# Patient Record
Sex: FEMALE | Race: WHITE
[De-identification: names, ages, dates, MRNs, and addresses within clinical notes are randomized per-mention and may not be internally consistent; named-entity substitution may affect disease eponyms.]

---

## 2017-04-29 ENCOUNTER — HOSPITAL ENCOUNTER (EMERGENCY)
Dept: HOSPITAL 41 - JD.ED | Age: 22
Discharge: HOME | End: 2017-04-29
Payer: COMMERCIAL

## 2017-04-29 VITALS — SYSTOLIC BLOOD PRESSURE: 100 MMHG | DIASTOLIC BLOOD PRESSURE: 65 MMHG

## 2017-04-29 DIAGNOSIS — F41.8: Primary | ICD-10-CM

## 2017-04-29 DIAGNOSIS — Z79.899: ICD-10-CM

## 2017-04-29 DIAGNOSIS — K21.9: ICD-10-CM

## 2017-04-29 PROCEDURE — 36415 COLL VENOUS BLD VENIPUNCTURE: CPT

## 2017-04-29 PROCEDURE — 80306 DRUG TEST PRSMV INSTRMNT: CPT

## 2017-04-29 PROCEDURE — 99285 EMERGENCY DEPT VISIT HI MDM: CPT

## 2017-04-29 PROCEDURE — 81001 URINALYSIS AUTO W/SCOPE: CPT

## 2017-04-29 PROCEDURE — 85025 COMPLETE CBC W/AUTO DIFF WBC: CPT

## 2017-04-29 PROCEDURE — 84443 ASSAY THYROID STIM HORMONE: CPT

## 2017-04-29 PROCEDURE — 80053 COMPREHEN METABOLIC PANEL: CPT

## 2017-04-29 PROCEDURE — 84702 CHORIONIC GONADOTROPIN TEST: CPT
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
## 2017-07-28 ENCOUNTER — HOSPITAL ENCOUNTER (EMERGENCY)
Dept: HOSPITAL 41 - JD.ED | Age: 22
Discharge: HOME | End: 2017-07-28
Payer: COMMERCIAL

## 2017-07-28 VITALS — SYSTOLIC BLOOD PRESSURE: 115 MMHG | DIASTOLIC BLOOD PRESSURE: 69 MMHG
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
## 2017-07-28 DIAGNOSIS — F31.9: ICD-10-CM

## 2017-07-28 DIAGNOSIS — S66.912A: Primary | ICD-10-CM

## 2017-07-28 DIAGNOSIS — Z79.899: ICD-10-CM

## 2017-07-28 DIAGNOSIS — K21.9: ICD-10-CM
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
## 2019-02-14 ENCOUNTER — HOSPITAL ENCOUNTER (INPATIENT)
Dept: HOSPITAL 41 - JD.OB | Age: 24
LOS: 2 days | Discharge: HOME | End: 2019-02-16
Attending: OBSTETRICS & GYNECOLOGY | Admitting: OBSTETRICS & GYNECOLOGY
Payer: COMMERCIAL

## 2019-02-14 DIAGNOSIS — K21.9: ICD-10-CM

## 2019-02-14 DIAGNOSIS — Z3A.39: ICD-10-CM

## 2019-02-14 DIAGNOSIS — F31.9: ICD-10-CM

## 2019-02-14 DIAGNOSIS — Z79.899: ICD-10-CM

## 2019-02-14 DIAGNOSIS — M79.7: ICD-10-CM

## 2019-02-14 DIAGNOSIS — F41.9: ICD-10-CM

## 2019-02-14 PROCEDURE — 10907ZC DRAINAGE OF AMNIOTIC FLUID, THERAPEUTIC FROM PRODUCTS OF CONCEPTION, VIA NATURAL OR ARTIFICIAL OPENING: ICD-10-PCS | Performed by: OBSTETRICS & GYNECOLOGY

## 2019-02-14 PROCEDURE — 3E0R3BZ INTRODUCTION OF ANESTHETIC AGENT INTO SPINAL CANAL, PERCUTANEOUS APPROACH: ICD-10-PCS

## 2019-02-14 PROCEDURE — 3E0P7VZ INTRODUCTION OF HORMONE INTO FEMALE REPRODUCTIVE, VIA NATURAL OR ARTIFICIAL OPENING: ICD-10-PCS | Performed by: OBSTETRICS & GYNECOLOGY

## 2019-02-14 PROCEDURE — 0HQ9XZZ REPAIR PERINEUM SKIN, EXTERNAL APPROACH: ICD-10-PCS | Performed by: OBSTETRICS & GYNECOLOGY

## 2019-02-14 PROCEDURE — 00HU33Z INSERTION OF INFUSION DEVICE INTO SPINAL CANAL, PERCUTANEOUS APPROACH: ICD-10-PCS

## 2019-02-14 NOTE — PCM.PREANE
Preanesthetic Assessment





- Anesthesia/Transfusion/Family Hx


Anesthesia History: Prior Anesthesia Without Reaction


Family History of Anesthesia Reaction: No


Transfusion History: Prior Transfusion Without Reaction





- Review of Systems


General: No Symptoms


Pulmonary: No Symptoms


Cardiovascular: No Symptoms


Gastrointestinal: Abdominal Pain


Neurological: No Symptoms


Other: Reports: None





- Physical Assessment


O2 Sat by Pulse Oximetry: 97


Respiratory Rate: 18


Vital Signs: 





 Last Vital Signs











Temp  37.1 C   02/14/19 07:23


 


Pulse  86   02/14/19 07:23


 


Resp  18   02/14/19 07:23


 


BP  107/77   02/14/19 07:23


 


Pulse Ox  97   02/14/19 07:23











Height: 1.65 m


Weight: 89.675 kg


ASA Class: 2


Mental Status: Alert & Oriented x3


Airway Class: Mallampati = 1


Dentition: Reports: Normal Dentition


Thyro-Mental Finger Breadths: 3


Mouth Opening Finger Breadths: 3


ROM/Head Extension: Full


Lungs: Clear to Auscultation, Normal Respiratory Effort


Cardiovascular: Regular Rate, Regular Rhythm





- Lab


Values: 





 Laboratory Last Values











WBC  10.29 K/mm3 (3.98-10.04)  H  02/14/19  08:30    


 


RBC  4.08 M/mm3 (3.98-5.22)   02/14/19  08:30    


 


Hgb  10.8 gm/L (11.2-15.7)  L  02/14/19  08:30    


 


Hct  34.6 % (34.1-44.9)   02/14/19  08:30    


 


MCV  84.8 fl (79.4-94.8)   02/14/19  08:30    


 


MCH  26.5 pg (25.6-32.2)   02/14/19  08:30    


 


MCHC  31.2 g/dl (32.2-35.5)  L  02/14/19  08:30    


 


RDW Std Deviation  60.3 fL (36.4-46.3)  H  02/14/19  08:30    


 


Plt Count  122 K/mm3 (182-369)  L  02/14/19  08:30    


 


MPV  11.5 fl (9.4-12.3)   02/14/19  08:30    


 


Neut % (Auto)  79.6 % (34.0-71.1)  H  02/14/19  08:30    


 


Lymph % (Auto)  14.1 % (19.3-51.7)  L  02/14/19  08:30    


 


Mono % (Auto)  4.7 % (4.7-12.5)   02/14/19  08:30    


 


Eos % (Auto)  1.0  (0.7-5.8)   02/14/19  08:30    


 


Baso % (Auto)  0.1 % (0.1-1.2)   02/14/19  08:30    


 


Neut # (Auto)  8.20 K/mm3 (1.56-6.13)  H  02/14/19  08:30    


 


Lymph # (Auto)  1.45 K/mm3 (1.18-3.74)   02/14/19  08:30    


 


Mono # (Auto)  0.48 K/mm3 (0.24-0.36)  H  02/14/19  08:30    


 


Eos # (Auto)  0.10 K/mm3 (0.04-0.36)   02/14/19  08:30    


 


Baso # (Auto)  0.01 K/mm3 (0.01-0.08)   02/14/19  08:30    


 


Blood Type  A NEGATIVE   02/14/19  08:30    


 


Gel Antibody Screen  Positive   02/14/19  08:30    


 


Crossmatch  See Detail   02/14/19  08:30    














- Allergies


Allergies/Adverse Reactions: 


 Allergies











Allergy/AdvReac Type Severity Reaction Status Date / Time


 


No Known Allergies Allergy   Verified 07/28/17 12:53














- Acknowledgements


Anesthesia Type Planned: Epidural


Pt an Appropriate Candidate for the Planned Anesthesia: Yes


Alternatives and Risks of Anesthesia Discussed w Pt/Guardian: Yes


Pt/Guardian Understands and Agrees with Anesthesia Plan: Yes





PreAnesthesia Questionnaire





- Past Health History


Medical/Surgical History: Denies Medical/Surgical History


Cardiovascular History: Reports: None


Respiratory History: Reports: None


Gastrointestinal History: Reports: GERD


Genitourinary History: Reports: None


OB/GYN History: Reports: Pregnancy


Musculoskeletal History: Reports: Fibromyalgia


Neurological History: Reports: None


Psychiatric History: Reports: Anxiety, Bipolar, Depression, PTSD


Other Psychiatric History: postpartum;hypermania


Endocrine/Metabolic History: Reports: None


Hematologic History: Reports: Anemia


Other Hematologic History: pregnancy induced


Immunologic History: Reports: None


Oncologic (Cancer) History: Reports: None


Dermatologic History: Reports: None





- Past Surgical History


Head Surgeries/Procedures: Reports: None





- SUBSTANCE USE


Smoking Status *Q: Never Smoker


Second Hand Smoke Exposure: No


Recreational Drug Use History: No





- HOME MEDS


Home Medications: 


 Home Meds





Acetaminophen [Tylenol] 650 mg PO Q4H PRN #0 tablet 10/12/16 [Rx]


Ibuprofen [IJD: Ibuprofen] 600 mg PO Q6H PRN #0 tablet 10/12/16 [Rx]


Birth Control 1 tab PO DAILY 04/29/17 [History]


lamoTRIgine [Lamotrigine] 200 mg PO DAILY 04/29/17 [History]


ClonazePAM [KlonoPIN] 0.25 mg PO BID PRN 07/28/17 [History]


QUEtiapine [SEROquel] 25 mg PO BEDTIME 07/28/17 [History]











- CURRENT (IN HOUSE) MEDS


Current Meds: 





 Current Medications





Diphenhydramine HCl (Benadryl)  25 mg IVPUSH Q6H PRN


   PRN Reason: Pruritis


Ephedrine Sulfate (Ephedrine Sulfate)  5 mg IVPUSH ASDIRECTED PRN


   PRN Reason: Hypotension


Fentanyl (Sublimaze)  100 mcg EPIDUR ONETIME PRN


   PRN Reason: Pain


   Last Admin: 02/14/19 12:34 Dose:  100 mcg


Fentanyl/Bupivacaine HCl (Fentanyl-Bupiv-Ns 2 Mcg/Ml-0.125%)  100 ml EP 

ASDIRECTED JOAN


   Last Admin: 02/14/19 12:34 Dose:  100 ml


Lactated Ringer's (Ringers, Lactated)  1,000 mls @ 100 mls/hr IV ASDIRECTED JOAN


   Last Admin: 02/14/19 12:15 Dose:  100 mls/hr


Oxytocin/Lactated Ringer's (Pitocin In Lr 10 Units/1,000 Ml)  10 unit in 1,000 

mls @ 12 mls/hr IV TITRATE JOAN; Protocol


Oxytocin/Lactated Ringer's (Pitocin In Lr 10 Units/1,000 Ml)  10 unit in 1,000 

mls @ 500 mls/hr IV .CONTINUOUS JOAN


Misoprostol (Cytotec)  50 mcg VAG Q3HR JOAN


Nalbuphine HCl (Nubain)  10 mg IVPUSH Q2H PRN


   PRN Reason: pain


Ondansetron HCl (Zofran)  4 mg IVPUSH ONETIME PRN


   PRN Reason: Nausea/Vomiting


Sodium Chloride (Saline Flush)  10 ml FLUSH ASDIRECTED PRN


   PRN Reason: Keep Vein Open





Discontinued Medications





Bupivacaine HCl (Marcaine 0.5%) Confirm Administered Dose 30 ml .ROUTE .STK-MED 

ONE


   Stop: 02/14/19 08:02


Misoprostol (Cytotec) Confirm Administered Dose 25 mcg .ROUTE .STK-MED ONE


   Stop: 02/14/19 07:54


Misoprostol (Cytotec)  25 mcg VAG ONETIME ONE


   Stop: 02/14/19 08:20


   Last Admin: 02/14/19 07:55 Dose:  25 mcg

## 2019-02-14 NOTE — PCM.LDHP
L&D History of Present Illness





- General


Date of Service: 19


Admit Problem/Dx: 


 Admission Diagnosis/Problem











Admission Diagnosis/Problem    














Source of Information: Patient


History Limitations: Reports: No Limitations





- History of Present Illness


Introduction:: 





24-year-old  002 ROSA 2/15/19. Estimated gestational age 39 weeks and 6 

days. Presented to labor and delivery for induction of labor GBS negative. 

Patient has gestational diabetes taking glyburide 2.5 mg at breakfast and lunch 

and supper. Blood type A- antibody screen negative hemoglobin hematocrit 13.1 

39.3 on 18. Platelets 209,000. Rubella immune. Serology nonreactive. Urine 

culture mixed effie. Hepatitis B surface antigen negative. HIV negative. GC and 

chlamydia probe negative TSH within normal limits.





18 hemoglobin/hematocrit 10.3/32.6 platelets 178,000, 1 hour OB glucose 

screen 141. Three-hour glucose tolerance test fasting 73, 1 hour 143, two-hour 

166, 3 hour 157. Antibody screen negative RhoGam given on 18. RPR 

nonreactive.





1/3/19 hemoglobin/hematocrit 10.5/35.0 platelets 154,000.





19 group B strep negative.





Plan induction and delivery.





This note was created, at least in part, by the use of Dragon voice dictation 

system. Inadvertent typographical errors, due to software recognition problems, 

may exist.


Improves with: Reports: None


Worsens with: Reports: None


Associated Symptoms: Reports: N





- Related Data


Allergies/Adverse Reactions: 


 Allergies











Allergy/AdvReac Type Severity Reaction Status Date / Time


 


No Known Allergies Allergy   Verified 17 12:53











Home Medications: 


 Home Meds





Acetaminophen [Tylenol] 650 mg PO Q4H PRN #0 tablet 10/12/16 [Rx]


Ibuprofen [IJD: Ibuprofen] 600 mg PO Q6H PRN #0 tablet 10/12/16 [Rx]


Birth Control 1 tab PO DAILY 17 [History]


lamoTRIgine [Lamotrigine] 200 mg PO DAILY 17 [History]


ClonazePAM [KlonoPIN] 0.25 mg PO BID PRN 17 [History]


QUEtiapine [SEROquel] 25 mg PO BEDTIME 17 [History]











Past Medical History





- Past Health History


Medical/Surgical History: Denies Medical/Surgical History


Cardiovascular History: Reports: None


Respiratory History: Reports: None


Gastrointestinal History: Reports: GERD


Genitourinary History: Reports: None


OB/GYN History: Reports: Pregnancy


Musculoskeletal History: Reports: Fibromyalgia


Neurological History: Reports: None


Psychiatric History: Reports: Anxiety, Bipolar, Depression


Other Psychiatric History: postpartum;hypermania


Endocrine/Metabolic History: Reports: None


Hematologic History: Reports: Anemia


Other Hematologic History: pregnancy induced


Immunologic History: Reports: None


Oncologic (Cancer) History: Reports: None


Dermatologic History: Reports: None





- Past Surgical History


Head Surgeries/Procedures: Reports: None





Social & Family History





- Family History


Family Medical History: Noncontributory





- Caffeine Use


Caffeine Use: Reports: Coffee





H&P Review of Systems





- Review of Systems:


Review Of Systems: See Below


General: Reports: No Symptoms


HEENT: Reports: No Symptoms


Pulmonary: Reports: No Symptoms


Cardiovascular: Reports: No Symptoms


Gastrointestinal: Reports: No Symptoms


Genitourinary: Reports: No Symptoms


Musculoskeletal: Reports: No Symptoms


Skin: Reports: No Symptoms


Psychiatric: Reports: No Symptoms


Neurological: Reports: No Symptoms


Hematologic/Lymphatic: Reports: No Symptoms


Immunologic: Reports: No Symptoms





L&D Exam





- Exam


Exam: See Below





- OB Specific


Fundal Height In cm: 39


Fetal Movement: Active


Fetal Heart Tones: Present


Fetal Heart Tones per Min: 135


Fetal Heart Rate (FHR) Variability: Moderate (6-25 bmp)


Birth Presentation: Vertex





- Fontana Score


Fontana Score Cervix Position: Posterior


Fontana Score Consistency: Soft


Fontana Score Effacement: 0-30%


Fontana Score Dilation: 1-2 cm


Fontana Score Infant's Station: -1 ,0


Fontana Score Total: 5





- Exam


General: Alert, Oriented


HEENT: Conjunctiva Clear, Mucosa Moist & Pink, Pupils Equal, PERRLA


Neck: Supple, Trachea Midline


Lungs: Clear to Auscultation, Normal Respiratory Effort


Cardiovascular: Regular Rate, Regular Rhythm


GI/Abdominal Exam: Normal Bowel Sounds, Soft


Genitourinary: Normal external exam


Extremities: Normal Inspection, Normal Range of Motion, Non-Tender, No Pedal 

Edema, Normal Capillary Refill


Skin: Warm, Dry, Intact


Neurological: Reflexes Equal Bilateral


Psychiatric: Alert, Normal Affect, Normal Mood





- Problem List


(1) 39 weeks gestation of pregnancy


SNOMED Code(s): 33635005


   ICD Code: Z3A.39 - 39 WEEKS GESTATION OF PREGNANCY   Status: Acute   Current 

Visit: Yes   





(2) Gestational diabetes mellitus (GDM) affecting third pregnancy


SNOMED Code(s): 68424714360173


   ICD Code: O24.419 - GESTATIONAL DIABETES MELLITUS IN PREGNANCY, UNSP CONTROL

   Status: Acute   Current Visit: Yes   


Problem List Initiated/Reviewed/Updated: No


Assessment/Plan Comment:: 





Plan induction and delivery.





For Cytotec 25 g placed at 0800 hrs.

## 2019-02-14 NOTE — PCM.SN
- Free Text/Narrative


Note: 





Cervix 8 cm, 100% effaced, soft, posterior, vertex at 0 station. Cat I FHR

## 2019-02-14 NOTE — PCM.DEL
L & D Note





- General Info


Date of Service: 19


Mother's Due Date: 02/15/19





- Delivery Note


Labor: Augmented by ARM, Augmented by Oxytocin


Cervical Ripening Method: Misoprostil (25 mcg x1)


Delivery Outcome: Livebirth (Male liveborn 19 at 1635 hrs. BERTHA no 

nuchal cord Apgars 8/9 weight 3780 grams/8 pounds 5.3 ounces)


Infant Delivery Method: Spontaneous Vaginal Delivery-Single


Infant Delivery Mode: Spontaneous


Birth Presentation: Right Occiput Anterior (BERTHA)


Nuchal Cord: None


Prep: Povidone-Iodine (Betadine


Anesthesia Type: Epidural


Amniotic Fluid Description: Clear


Episiotomy Type: None


Laceration: 1st Degree (Approximate 1 x 1 cm sutured with 3-0 Monocryl times 

one figure-of-eight suture.)


Suture type: Other (Monocryl)


Suture size: 3-0


Placenta: Intact, Spontaneous (Examined intact discarded [id number])


Cord: 3 Vessels


Estimated Blood Loss: 250


Resuscitation Needed: No


[place]: Suctioned, Bulb Syringe, Stimulated, Warmed, Glendo Used, Warmer Used


 Provider: Bartolome Corrales


APGAR Score 1 min: 8


APGAR Score 5 min: 9





- General Info


Date of Service: 19


Functional Status: Reports: Pain Controlled





- Review of Systems


General: Reports: No Symptoms


HEENT: Reports: No Symptoms


Pulmonary: Reports: No Symptoms


Cardiovascular: Reports: No Symptoms


Gastrointestinal: Reports: No Symptoms


Genitourinary: Reports: No Symptoms


Musculoskeletal: Reports: No Symptoms


Skin: Reports: No Symptoms


Neurological: Reports: No Symptoms


Psychiatric: Reports: No Symptoms





- Patient Data


Vitals - Most Recent: 


 Last Vital Signs











Temp  98.7 F   19 07:23


 


Pulse  86   19 07:23


 


Resp  18   19 12:36


 


BP  107/77   19 07:23


 


Pulse Ox  97   19 12:36











Weight - Most Recent: 197 lb 11.2 oz


Lab Results Last 24 Hours: 


 Laboratory Results - last 24 hr











  19 Range/Units





  08:30 08:30 


 


WBC  10.29 H   (3.98-10.04)  K/mm3


 


RBC  4.08   (3.98-5.22)  M/mm3


 


Hgb  10.8 L   (11.2-15.7)  gm/L


 


Hct  34.6   (34.1-44.9)  %


 


MCV  84.8   (79.4-94.8)  fl


 


MCH  26.5   (25.6-32.2)  pg


 


MCHC  31.2 L   (32.2-35.5)  g/dl


 


RDW Std Deviation  60.3 H   (36.4-46.3)  fL


 


Plt Count  122 L   (182-369)  K/mm3


 


MPV  11.5   (9.4-12.3)  fl


 


Neut % (Auto)  79.6 H   (34.0-71.1)  %


 


Lymph % (Auto)  14.1 L   (19.3-51.7)  %


 


Mono % (Auto)  4.7   (4.7-12.5)  %


 


Eos % (Auto)  1.0   (0.7-5.8)  


 


Baso % (Auto)  0.1   (0.1-1.2)  %


 


Neut # (Auto)  8.20 H   (1.56-6.13)  K/mm3


 


Lymph # (Auto)  1.45   (1.18-3.74)  K/mm3


 


Mono # (Auto)  0.48 H   (0.24-0.36)  K/mm3


 


Eos # (Auto)  0.10   (0.04-0.36)  K/mm3


 


Baso # (Auto)  0.01   (0.01-0.08)  K/mm3


 


Blood Type   A NEGATIVE  


 


Gel Antibody Screen   Positive  


 


Crossmatch   See Detail  











Med Orders - Current: 


 Current Medications





Diphenhydramine HCl (Benadryl)  25 mg IVPUSH Q6H PRN


   PRN Reason: Pruritis


   Last Admin: 19 14:57 Dose:  25 mg


Ephedrine Sulfate (Ephedrine Sulfate)  5 mg IVPUSH ASDIRECTED PRN


   PRN Reason: Hypotension


Fentanyl (Sublimaze)  100 mcg EPIDUR ONETIME PRN


   PRN Reason: Pain


   Last Admin: 19 12:34 Dose:  100 mcg


Fentanyl/Bupivacaine HCl (Fentanyl-Bupiv-Ns 2 Mcg/Ml-0.125%)  100 ml EP 

ASDIRECTED JOAN


   Last Admin: 19 12:34 Dose:  100 ml


Lactated Ringer's (Ringers, Lactated)  1,000 mls @ 100 mls/hr IV ASDIRECTED JOAN


   Last Admin: 19 13:00 Dose:  100 mls/hr


Oxytocin/Lactated Ringer's (Pitocin In Lr 10 Units/1,000 Ml)  10 unit in 1,000 

mls @ 12 mls/hr IV TITRATE JOAN; Protocol


   Last Titration: 19 14:46 Dose:  1,444 munits/min, 8,664 mls/hr


Oxytocin/Lactated Ringer's (Pitocin In Lr 10 Units/1,000 Ml)  10 unit in 1,000 

mls @ 500 mls/hr IV .CONTINUOUS JOAN


Oxytocin 20 unit/ Lactated (Ringer's)  1,002 mls @ 1,503 mls/hr IV ASDIRECTED 

JOAN


Oxytocin/Lactated Ringer's (Pitocin In Lr 20 Units/1,000 Ml)  20 unit in 1,000 

mls @ 500 mls/hr IV ASDIRECTED JOAN


Misoprostol (Cytotec)  50 mcg VAG Q3HR JOAN


Nalbuphine HCl (Nubain)  10 mg IVPUSH Q2H PRN


   PRN Reason: pain


Ondansetron HCl (Zofran)  4 mg IVPUSH ONETIME PRN


   PRN Reason: Nausea/Vomiting


Sodium Chloride (Saline Flush)  10 ml FLUSH ASDIRECTED PRN


   PRN Reason: Keep Vein Open





Discontinued Medications





Bupivacaine HCl (Marcaine 0.5%) Confirm Administered Dose 30 ml .ROUTE .STK-MED 

ONE


   Stop: 19 08:02


Oxytocin 20 unit/ Lactated (Ringer's)  1,002 mls @ 1,503 mls/hr IV TITRATE JOAN


Misoprostol (Cytotec) Confirm Administered Dose 25 mcg .ROUTE .STK-MED ONE


   Stop: 19 07:54


Misoprostol (Cytotec)  25 mcg VAG ONETIME ONE


   Stop: 19 08:20


   Last Admin: 19 07:55 Dose:  25 mcg


Misoprostol (Cytotec)  400 mcg PO ONETIME ONE


   Stop: 19 16:16











- Exam


General: Alert, Oriented


HEENT: Pupils Equal


Neck: Supple


GI/Abdominal Exam: Soft, Non-Tender


 (Female) Exam: Normal External Exam


Extremities: Normal Inspection, Normal Range of Motion, Non-Tender, No Pedal 

Edema, Normal Capillary Refill


Skin: Warm, Dry, Intact


Psy/Mental Status: Alert, Normal Affect, Normal Mood





- Problem List & Annotations


(1) 39 weeks gestation of pregnancy


SNOMED Code(s): 32930746


   Code(s): Z3A.39 - 39 WEEKS GESTATION OF PREGNANCY   Status: Acute   Current 

Visit: Yes   





(2) Gestational diabetes mellitus (GDM) affecting third pregnancy


SNOMED Code(s): 73315992842930


   Code(s): O24.419 - GESTATIONAL DIABETES MELLITUS IN PREGNANCY, UNSP CONTROL 

  Status: Acute   Current Visit: Yes   





(3) First degree perineal laceration


SNOMED Code(s): 29744452


   Code(s): O70.0 - FIRST DEGREE PERINEAL LACERATION DURING DELIVERY   Status: 

Acute   Current Visit: Yes   





(4) Encounter for full-term uncomplicated delivery


SNOMED Code(s): 40188681, 891059260


   Code(s): O80 - ENCOUNTER FOR FULL-TERM UNCOMPLICATED DELIVERY   Status: 

Acute   Current Visit: Yes   





- Problem List Review


Problem List Initiated/Reviewed/Updated: No





- My Orders


Last 24 Hours: 


My Active Orders





19 08:15


Lactated Ringers [Ringers, Lactated] 1,000 ml IV ASDIRECTED 


Nalbuphine [Nubain]   10 mg IVPUSH Q2H PRN 


Oxytocin/Lactated Ringers [Pitocin in LR 10 Units/1,000 ML] 10 unit in 1,000 ml 

IV .CONTINUOUS 


Oxytocin/Lactated Ringers [Pitocin in LR 10 Units/1,000 ML] 10 unit in 1,000 ml 

IV TITRATE 


Sodium Chloride 0.9% [Saline Flush]   10 ml FLUSH ASDIRECTED PRN 


Resuscitation Status Routine 





19 08:16


Patient Status [ADT] Routine 


Activity as Tolerated [RC] PFP 


Communication Order [RC] ASDIRECTED 


Fetal Non Stress Test [RC] PER UNIT ROUTINE 


Notify Provider [RC] PFP 


Notify Provider [RC] PRN 


Vital Signs [RC] PER UNIT ROUTINE 


Electronic Fetal Heart Tones Ext w TOCO [WOMSER] Routine 


Electronic Fetal Heart Tones Internal [WOMSER] Per Unit Routine 


Peripheral IV Insertion Adult [OM.PC] Routine 





19 08:17


Fetal Heart Tones [RC] ASDIRECTED 


Peripheral IV Care [RC] .AS DIRECTED 





19 08:30


ANTIBODY IDENTIFICATION [BBK] Stat 


RAPID PLASMA REAGIN,RPR [CHEM] Routine 


RED BLOOD CELLS LP [BBK] Routine 


TYPE AND SCREEN [BBK] Stat 





19 11:00


miSOPROStol [Cytotec]   50 mcg VAG Q3HR 





19 16:15


Oxytocin [Pitocin] 20 unit   Lactated Ringers [Ringers, Lactated] 1,000 ml IV 

ASDIRECTED 


Oxytocin/Lactated Ringers [Pitocin in LR 20 Units/1,000 ML] 20 unit in 1,000 ml 

IV ASDIRECTED 





19 Lunch


Consistent Carbohydrate Diet [DIET] 














- Plan


Plan:: 





Plan induction and delivery.





For Cytotec 25 g placed at 0800 hrs.

## 2019-02-15 NOTE — PCM.SN
- Free Text/Narrative


Note: 





Postpartum day 1





Patient is afebrile. Abdomen soft uterus involuting normally. No heavy vaginal 

bleeding. No leg cramping





Patient was given 400 g Cytotec 200 per each cheek buccal cavity at delivery 

and 200 g by mouth every 6 hours for another 3 doses. Bleeding has been 

minimal. (Patient had postpartum hemorrhage with prior delivery (second delivery

))





Patient will probably be able to be dismissed tomorrow 2/16/19. Dr. Goff will 

see patient on rounds and dismissed when ready.

## 2019-02-16 VITALS — DIASTOLIC BLOOD PRESSURE: 65 MMHG | SYSTOLIC BLOOD PRESSURE: 110 MMHG

## 2019-02-16 NOTE — [id number]
**Discharge Summary





- Hospital Course


Free Text/Narrative:: 





24-year-old  1 para 1001 white female admitted for elective induction of 

labor at 39 weeks gestational age. Delivered without concerns. History of 

gestational diabetes on glyburide therapy and recently well controlled.














She delivered a live male infant on 2019 at 1635 hrs. Apgars were 8 and 9. 

Weight was 3780 g (8 pounds 5.3 ounces). In right occiput anterior position. 

She had a first-degree laceration which was sutured in a routine fashion with 3-

0 Monocryl. Postpartum patient is done well. She is nursing. Vital signs and 

stable. She has minimal lochia, was ambulating well and voiding without 

problems. She is desiring discharge home.





Diagnosis: Stroke: No





- Discharge Data


Discharge Date: 19


Discharge Disposition: Home, Self-Care 01


Condition: Good





- Patient Instructions


Diet: Regular Diet as Tolerated (Nursing diet with increased calories calcium 

is recommended)


Activity: As Tolerated ([place] or tampons until bleeding resolves)


Driving: May Drive Today


Showering/Bathing: May Shower (May take a bath)


Notify Provider of: Fever, Increased Pain, Swelling and Redness, Nausea and/or 

Vomiting





- Discharge Plan


Home Medications: 


 Home Meds





Acetaminophen [Tylenol] 650 mg PO Q4H PRN #0 tablet 10/12/16 [Rx]


Ibuprofen [IJD: Ibuprofen] 600 mg PO Q6H PRN #0 tablet 10/12/16 [Rx]


lamoTRIgine [Lamotrigine] 200 mg PO DAILY 17 [History]








Patient Handouts:  Breast Pumping Tips, Vaginal Delivery, Care After


Referrals: 


Bartolome Corrales MD [Primary Care Provider] - 





- Discharge Summary/Plan Comment


DC Time >30 min.: No


Discharge Summary/Plan Comment: 








Discharge instructions:





1. Discharge home





2. Diet, activity and follow-up discussed with patient. Recommend nursing diet 

with increased calories and calcium.





3. Precautions given concern increased pain, bleeding, temperature, signs/

symptoms of DVT/PE.





4. Medications per home medication was printed, discussed with and given to the 

patient.





5. Return to clinic-Dr. [facility] in 2 weeks.





Diagnosis:





1. Term pregnancy-delivered 





2. History of gestational diabetes, controlled with diet and glyburide therapy





Condition: Good





- Patient Data


Vitals - Most Recent: 


 Last Vital Signs











Temp  36.6 C   19 05:02


 


Pulse  49 L  19 05:02


 


Resp  12   19 05:02


 


BP  101/46 L  19 05:02


 


Pulse Ox  99   19 05:02











Weight - Most Recent: 89.675 kg


Lab Results - Last 24 hrs: 


 Laboratory Results - last 24 hr











  02/14/19 02/15/19 Range/Units





  08:30 06:28 


 


RPR  Non-reactive   (NONREACTIVE)  


 


Blood Type   A NEGATIVE  


 


Fetal Screen   1 ros/5 flds - neg  


 


RhIG Candidate?   Yes  


 


Rhogam Indicated   Yes, baby rh pos H  











Med Orders - Current: 


 Current Medications





Acetaminophen (Tylenol)  650 mg PO Q4H PRN


   PRN Reason: mild pain or fever


   Last Admin: 02/15/19 23:02 Dose:  650 mg


Benzocaine/Menthol (Dermoplast Pain Relief Spray)  0 gm TOP ASDIRECTED PRN


   PRN Reason: Perineal Comfort Measure


   Last Admin: 19 20:57 Dose:  1 can


Docusate Sodium (Colace)  100 mg PO BID PRN


   PRN Reason: Constipation


Emollient Ointment (Lansinoh Hpa)  0 gm TOP ASDIRECTED PRN


   PRN Reason: Sore Nipples


   Last Admin: 02/15/19 02:56 Dose:  1 tube


Ibuprofen (Motrin)  600 mg PO Q4H PRN


   PRN Reason: Mild pain or fever


   Last Admin: 19 09:05 Dose:  600 mg


Witch Hazel (Tucks)  1 pad TOP ASDIRECTED PRN


   PRN Reason: Hemorrhoid pain


   Last Admin: 19 20:57 Dose:  1 container





Discontinued Medications





Bupivacaine HCl (Marcaine 0.5%) Confirm Administered Dose 30 ml .ROUTE .STK-MED 

ONE


   Stop: 19 08:02


Bupivacaine HCl (Sensorcaine-Mpf 0.25%)  10 ml .ROUTE .STK-MED ONE


   Stop: 19 22:01


Diphenhydramine HCl (Benadryl)  25 mg IVPUSH Q6H PRN


   PRN Reason: Pruritis


   Last Admin: 19 14:57 Dose:  25 mg


Ephedrine Sulfate (Ephedrine Sulfate)  5 mg IVPUSH ASDIRECTED PRN


   PRN Reason: Hypotension


Fentanyl (Sublimaze)  100 mcg EPIDUR ONETIME PRN


   PRN Reason: Pain


   Last Admin: 19 12:34 Dose:  100 mcg


Fentanyl/Bupivacaine HCl (Fentanyl-Bupiv-Ns 2 Mcg/Ml-0.125%)  100 ml EP 

ASDIRECTED JOAN


   Last Admin: 19 12:34 Dose:  100 ml


Lactated Ringer's (Ringers, Lactated)  1,000 mls @ 100 mls/hr IV ASDIRECTED JOAN


   Last Admin: 19 13:00 Dose:  100 mls/hr


Oxytocin/Lactated Ringer's (Pitocin In Lr 10 Units/1,000 Ml)  10 unit in 1,000 

mls @ 12 mls/hr IV TITRATE JOAN; Protocol


   Last Titration: 19 14:46 Dose:  1,444 munits/min, 8,664 mls/hr


Oxytocin/Lactated Ringer's (Pitocin In Lr 10 Units/1,000 Ml)  10 unit in 1,000 

mls @ 500 mls/hr IV .CONTINUOUS JOAN


Oxytocin 20 unit/ Lactated (Ringer's)  1,002 mls @ 1,503 mls/hr IV TITRATE JOAN


Oxytocin 20 unit/ Lactated (Ringer's)  1,002 mls @ 1,503 mls/hr IV ASDIRECTED 

JOAN


Oxytocin/Lactated Ringer's (Pitocin In Lr 20 Units/1,000 Ml)  20 unit in 1,000 

mls @ 500 mls/hr IV ASDIRECTED JOAN


   Last Admin: 19 16:36 Dose:  500 mls/hr


Lidocaine/Epinephrine (Xylocaine-Mpf 1.5% W/Epinephrine 1:200,000)  5 ml .ROUTE 

.STK-MED ONE


   Stop: 19 22:01


Misoprostol (Cytotec) Confirm Administered Dose 25 mcg .ROUTE .STK-MED ONE


   Stop: 19 07:54


Misoprostol (Cytotec)  25 mcg VAG ONETIME ONE


   Stop: 19 08:20


   Last Admin: 19 07:55 Dose:  25 mcg


Misoprostol (Cytotec)  50 mcg VAG Q3HR JOAN


Misoprostol (Cytotec)  400 mcg PO ONETIME ONE


   Stop: 19 16:16


   Last Admin: 19 16:39 Dose:  400 mcg


Misoprostol (Cytotec)  200 mcg PO Q6H JOAN


   Stop: 02/15/19 10:01


   Last Admin: 02/15/19 10:02 Dose:  200 mcg


Nalbuphine HCl (Nubain)  10 mg IVPUSH Q2H PRN


   PRN Reason: pain


Ondansetron HCl (Zofran)  4 mg IVPUSH ONETIME PRN


   PRN Reason: Nausea/Vomiting


Sodium Chloride (Saline Flush)  10 ml FLUSH ASDIRECTED PRN


   PRN Reason: Keep Vein Open
